# Patient Record
Sex: FEMALE | Race: OTHER | HISPANIC OR LATINO | URBAN - METROPOLITAN AREA
[De-identification: names, ages, dates, MRNs, and addresses within clinical notes are randomized per-mention and may not be internally consistent; named-entity substitution may affect disease eponyms.]

---

## 2024-09-23 ENCOUNTER — EMERGENCY (EMERGENCY)
Facility: HOSPITAL | Age: 39
LOS: 1 days | Discharge: HOME | End: 2024-09-23
Attending: EMERGENCY MEDICINE | Admitting: EMERGENCY MEDICINE
Payer: MEDICAID

## 2024-09-23 VITALS
SYSTOLIC BLOOD PRESSURE: 122 MMHG | HEIGHT: 62 IN | RESPIRATION RATE: 18 BRPM | OXYGEN SATURATION: 99 % | DIASTOLIC BLOOD PRESSURE: 71 MMHG | WEIGHT: 149.91 LBS | HEART RATE: 89 BPM

## 2024-09-23 PROCEDURE — 99284 EMERGENCY DEPT VISIT MOD MDM: CPT

## 2024-09-23 RX ORDER — ACETAMINOPHEN WITH CODEINE 300MG-30MG
1 TABLET ORAL
Qty: 20 | Refills: 0
Start: 2024-09-23 | End: 2024-10-02

## 2024-09-23 RX ORDER — AMOXICILLIN AND CLAVULANATE POTASSIUM 250; 125 MG/1; MG/1
1 TABLET, FILM COATED ORAL ONCE
Refills: 0 | Status: COMPLETED | OUTPATIENT
Start: 2024-09-23 | End: 2024-09-23

## 2024-09-23 RX ORDER — DOXYCYCLINE MONOHYDRATE 100 MG
1 TABLET ORAL
Qty: 14 | Refills: 0
Start: 2024-09-23 | End: 2024-09-29

## 2024-09-23 RX ORDER — AMOXICILLIN AND CLAVULANATE POTASSIUM 250; 125 MG/1; MG/1
875 TABLET, FILM COATED ORAL
Qty: 14 | Refills: 0
Start: 2024-09-23 | End: 2024-09-29

## 2024-09-23 RX ORDER — DOXYCYCLINE MONOHYDRATE 100 MG
100 TABLET ORAL ONCE
Refills: 0 | Status: COMPLETED | OUTPATIENT
Start: 2024-09-23 | End: 2024-09-23

## 2024-09-23 RX ORDER — ACETAMINOPHEN WITH CODEINE 300MG-30MG
500 TABLET ORAL ONCE
Refills: 0 | Status: COMPLETED | OUTPATIENT
Start: 2024-09-23 | End: 2024-09-23

## 2024-09-23 RX ADMIN — Medication 100 MILLIGRAM(S): at 19:26

## 2024-09-23 RX ADMIN — Medication 500 MILLIGRAM(S): at 19:26

## 2024-09-23 RX ADMIN — AMOXICILLIN AND CLAVULANATE POTASSIUM 1 TABLET(S): 250; 125 TABLET, FILM COATED ORAL at 19:29

## 2024-09-23 NOTE — ED PROVIDER NOTE - CLINICAL SUMMARY MEDICAL DECISION MAKING FREE TEXT BOX
39-year-old female, denies past medical history, presents to the ED complaining of painful cyst along her left lower labia minora for the past 4 days. On exam patient is found to have some tenderness to palpation at the #7 position of the labium minora, consistent with likely early Bartholin cyst, however it is noted to be superficial in the area is not protruding out significantly.  Based on exam, it would be more appropriate for patient to have this drained by the specialist in the office.  Will plan to treat with Augmentin and doxycycline  and prescribe naproxen for symptomatic relief.  Patient will be given the information and instructions to follow-up at the GYN clinic for further management.  She is given strict return precautions to return to the ED for any worsening symptoms.  Patient stable on discharge and leaves in no acute distress.

## 2024-09-23 NOTE — ED ADULT NURSE NOTE - CCCP TRG CHIEF CMPLNT
Spasticity   - Baclofen 5 mg at night time.     Nerve pain.    - In 2 weeks plan to (around 2/8/2023)   Decrease gabapentin to 200 mg at night time for two weeks.     If no change in pain then decrease to 100 mg at night time for two weeks.   If no pain, can discontinue.     labia pain

## 2024-09-23 NOTE — ED PROVIDER NOTE - NS ED ROS FT
+L labia pain  Denies fevers, chills, nausea, vomiting, diarrhea, constipation, abdominal pain, urinary symptoms, chest pain, palpitations, shortness of breath, dyspnea on exertion, syncope/near syncope, cough/URI symptoms, headache, weakness, numbness, focal deficits, visual changes, gait or balance changes, dizziness

## 2024-09-23 NOTE — ED PROVIDER NOTE - NSFOLLOWUPINSTRUCTIONS_ED_ALL_ED_FT
FOLLOW UP ASAP AT OB/GYN - CALL FOR AN APPPOINTMENT   [facility]  [address] [place], [place], [address]  Phone: [phone]  Fax: [phone]    Qué es un quiste de la glándula de Bartolino?    Se trata de un pequeño saco de líquido que se forma cuando se bloquea la abertura de francisco glándula de Bartolino. Hay dos glándulas de Bartolino, francisco de cada lado, jaqueline debajo de la abertura de la vagina (figura 1).    Las glándulas de Bartolino producen pequeñas cantidades de líquido. El líquido ayuda a que la vulva (la laci alrededor de la abertura de la vagina) se mantenga húmeda. Si algo bloquea la abertura de francisco glándula de Bartolino, puede acumularse líquido y formar un quiste. Plato suele suceder en solo francisco glándula, no en ambas a la vez.    ¿Cuáles son los síntomas de un quiste de la glándula de Bartolino?    Podría notar que tiene un nódulo en la vulva, xavi los quistes de la glándula de Bartolino no suelen causar ningún otro síntoma. Si lo hacen, los principales síntomas son dolor o molestia al caminar, sentarse o tener relaciones sexuales.    Si el quiste de la glándula de Bartolino se infecta, puede formar un absceso. Un absceso es un depósito de pus. Los síntomas de un absceso de Bartolino incluyen:    ?Dolor intenso – Podría tener dolor al caminar. También es posible que no pueda sentarse o tener relaciones sexuales.    ?Inflamación    ?Enrojecimiento    ¿Alla consultar a un médico o enfermero?    Consulte a diaz médico o enfermero si:    ?Ve o siente un nódulo en la vulva.    ?Siente dolor al caminar, sentarse o tener relaciones sexuales.    ¿Es necesario que me realice pruebas?    Diaz médico o enfermero podría realizar francisco prueba llamada "biopsia" para detectar la presencia de cáncer, xavi esto solo se realiza en algunas situaciones. El cáncer en la glándula de Bartolino es poco frecuente, xavi puede ocurrir. En la biopsia, el médico orquidea francisco muestra pequeña de tejido de la laci. Luego, envía el tejido a un laboratorio. Otro médico la observa en un microscopio en busca de indicios de cáncer.    ¿Cómo se trata un quiste de la glándula de Bartolino?    El tratamiento depende del tamaño del quiste, si gogo causa síntomas y si está infectado (absceso). Si no tiene síntomas, es posible que no necesite tratamiento. De lo contrario, los tratamientos pueden incluir:    ?Manejo expectante – Consiste en intentar que el quiste drene por sí solo. Es posible que el médico o enfermero le pida que aplique compresas tibias (no calientes) en la laci afectada o que tome lane de asiento para ayudar a que esto suceda.    ?Drenaje del quiste o absceso – El médico podría hacer un pequeño mainor en el quiste para que pueda salir el líquido o pus. También puede colocar un globo diminuto en el mainor para evitar que se cierre completamente. El globo se conecta a un tubo muy pequeño llamado “catéter” que ayuda a drenar el líquido de la glándula de Bartolino. El médico retira el globo en aproximadamente un mes y kenan francisco pequeña abertura por la que se puede drenar el líquido. Gogo procedimiento con frecuencia se realiza en el consultorio del médico, xavi si tiene un absceso profundo o de gran tamaño, es posible que deba recibir tratamiento en un hospital.    Se enviará francisco muestra del pus o del líquido a un laboratorio para realizar pruebas. Si en la prueba se detectan ciertos tipos de bacterias o infecciones, es posible que necesite antibióticos. A menudo, los antibióticos no son necesarios, xavi es posible que se los indiquen en algunos casos, por ejemplo, si ya tuvo un absceso, si tiene otros síntomas (madison fiebre) o si tiene francisco infección de transmisión sexual.    ?Cirugía – Los médicos también pueden realizar francisco pequeña cirugía si el drenaje del quiste y la colocación de un globo no funcionan jimenez. Los médicos pueden realizar francisco nueva abertura para ayudar a drenar el líquido de la glándula, o pueden extirpar la glándula así madison el quiste o absceso. Sin embargo, la cirugía tiene mayores riesgos de causar efectos secundarios que otros tratamientos, por lo que los médicos solo la practican si los demás tratamientos no perez funcionado.

## 2024-09-23 NOTE — ED PROVIDER NOTE - PATIENT PORTAL LINK FT
You can access the FollowMyHealth Patient Portal offered by  by registering at the following website: [email or web address]. By joining [facility]’s FollowMyHealth portal, you will also be able to view your health information using other applications (apps) compatible with our system.

## 2024-09-23 NOTE — ED PROVIDER NOTE - OBJECTIVE STATEMENT
39-year-old female, denies past medical history, presents to the ED complaining of painful cyst along her left lower labia minora for the past 4 days.  Patient states she has not trialed any medications for pain relief.  She denies any similar symptoms in the past.  She endorses some subjective fevers and chills.  Denies nausea, vomiting, headache, dizziness, back pain, chest pain or shortness of breath.

## 2024-09-23 NOTE — ED PROVIDER NOTE - PHYSICAL EXAMINATION
VITAL SIGNS: I have reviewed nursing notes and confirm.  CONSTITUTIONAL: Well-developed; well-nourished; in no acute distress.  SKIN: No acute rash.  HEAD: Normocephalic; atraumatic.  CARD: No extremity cyanosis. RRR, S1S2.  RESP: Speaks in full, clear sentences. CTA sonia. No adventitious BS.  : +L labia minora fluctuance at the #7 position w/ ttp and mild fluctuance; +induration correlating to that area along the lower labia majora/bartholin's gland; no overlying erythema in the area.  EXT: Moves all extremities normally.  NEURO: Alert, oriented. Grossly unremarkable. No focal deficits. Fluent speech.   PSYCH: Cooperative, appropriate. Mood and affect wnl.

## 2024-09-27 DIAGNOSIS — Z88.2 ALLERGY STATUS TO SULFONAMIDES: ICD-10-CM

## 2024-09-27 DIAGNOSIS — N75.0 CYST OF BARTHOLIN'S GLAND: ICD-10-CM

## 2024-09-27 DIAGNOSIS — N90.9 NONINFLAMMATORY DISORDER OF VULVA AND PERINEUM, UNSPECIFIED: ICD-10-CM

## 2024-09-27 DIAGNOSIS — Z91.040 LATEX ALLERGY STATUS: ICD-10-CM
